# Patient Record
Sex: MALE | RURAL
[De-identification: names, ages, dates, MRNs, and addresses within clinical notes are randomized per-mention and may not be internally consistent; named-entity substitution may affect disease eponyms.]

---

## 2020-03-16 ENCOUNTER — HISTORICAL (OUTPATIENT)
Dept: ADMINISTRATIVE | Facility: HOSPITAL | Age: 50
End: 2020-03-16

## 2020-03-16 LAB
HCT VFR BLD AUTO: 45.2 % (ref 40–54)
HGB BLD-MCNC: 14.6 G/DL (ref 13.5–18)
MCHC RBC AUTO-ENTMCNC: 32.3 G/DL (ref 32–36)
POTASSIUM SERPL-SCNC: 4 MMOL/L (ref 3.5–5.1)

## 2020-03-17 ENCOUNTER — HISTORICAL (OUTPATIENT)
Dept: ADMINISTRATIVE | Facility: HOSPITAL | Age: 50
End: 2020-03-17

## 2020-03-17 LAB — URATE SERPL-MCNC: 5.6 MG/DL (ref 3.5–7.2)

## 2022-02-09 LAB — CRC RECOMMENDATION EXT: NORMAL

## 2024-10-15 ENCOUNTER — OFFICE VISIT (OUTPATIENT)
Dept: FAMILY MEDICINE | Facility: CLINIC | Age: 54
End: 2024-10-15
Payer: COMMERCIAL

## 2024-10-15 VITALS
HEIGHT: 66 IN | RESPIRATION RATE: 20 BRPM | SYSTOLIC BLOOD PRESSURE: 140 MMHG | WEIGHT: 194.63 LBS | DIASTOLIC BLOOD PRESSURE: 90 MMHG | BODY MASS INDEX: 31.28 KG/M2 | OXYGEN SATURATION: 98 % | TEMPERATURE: 98 F | HEART RATE: 87 BPM

## 2024-10-15 DIAGNOSIS — Z02.4 ENCOUNTER FOR DEPARTMENT OF TRANSPORTATION (DOT) EXAMINATION FOR DRIVING LICENSE RENEWAL: Primary | ICD-10-CM

## 2024-10-15 PROCEDURE — 1159F MED LIST DOCD IN RCRD: CPT | Mod: CPTII,,, | Performed by: NURSE PRACTITIONER

## 2024-10-15 PROCEDURE — 3077F SYST BP >= 140 MM HG: CPT | Mod: CPTII,,, | Performed by: NURSE PRACTITIONER

## 2024-10-15 PROCEDURE — 3080F DIAST BP >= 90 MM HG: CPT | Mod: CPTII,,, | Performed by: NURSE PRACTITIONER

## 2024-10-15 PROCEDURE — 99203 OFFICE O/P NEW LOW 30 MIN: CPT | Mod: ,,, | Performed by: NURSE PRACTITIONER

## 2024-10-15 PROCEDURE — 1160F RVW MEDS BY RX/DR IN RCRD: CPT | Mod: CPTII,,, | Performed by: NURSE PRACTITIONER

## 2024-10-15 PROCEDURE — 3008F BODY MASS INDEX DOCD: CPT | Mod: CPTII,,, | Performed by: NURSE PRACTITIONER

## 2024-10-15 RX ORDER — EZETIMIBE 10 MG/1
10 TABLET ORAL
COMMUNITY
Start: 2024-08-08

## 2024-10-15 RX ORDER — METOPROLOL TARTRATE 50 MG/1
50 TABLET ORAL 2 TIMES DAILY
COMMUNITY
Start: 2024-08-08

## 2024-10-15 RX ORDER — OMEGA-3-ACID ETHYL ESTERS 1 G/1
2 CAPSULE, LIQUID FILLED ORAL 2 TIMES DAILY
COMMUNITY
Start: 2024-08-21

## 2024-10-15 RX ORDER — AMLODIPINE BESYLATE 10 MG/1
10 TABLET ORAL
COMMUNITY
Start: 2024-08-08

## 2024-10-15 NOTE — ASSESSMENT & PLAN NOTE
Reviewed UA.  Advised  to carry a spare power source for hearing aids (if used) and to carry a spare pair of glasses/contact lenses if contacts or glasses are worn.  Discussed with  that there are potential side effects of medications (including over the counter medications) that may affect driving:  altered state of alertness, attention or temporary confusion, low blood pressure, sedation or dizziness.  Encouraged  to read labels on all medications prior to taking them.  Instructed pt not to drive/operate machinery for 12 hours after taking sedating medications.  Fatigue, lack of sleep, poor diet, emotional conditions, stress or illness can affect safe driving.  Pt verbalizes understanding.    Pt certified for one year certificate.

## 2024-10-15 NOTE — PROGRESS NOTES
"   RANDA Fields   [facility]  [address]  [place] [address]     PATIENT NAME: Rios James  : 1970  DATE: 10/15/24  MRN: [id number]      Billing Provider: RANDA Fields  Level of Service:   Patient PCP Information       Provider PCP Type    Primary Doctor No General            Reason for Visit / Chief Complaint: DOT physical    Health Maintenance Due   Topic Date Due    Hepatitis C Screening  Never done    Lipid Panel  Never done    HIV Screening  Never done    TETANUS VACCINE  Never done    Hemoglobin A1c (Diabetic Prevention Screening)  Never done    Colorectal Cancer Screening  Never done    Shingles Vaccine (1 of 2) Never done    Influenza Vaccine (1) Never done    COVID-19 Vaccine ( season) Never done          Update PCP  Update Chief Complaint         History of Present Illness / Problem Focused Workflow     Rios James presents to the clinic for DOT exam. Pt has hx of HTN and takes amlodipine, metoprolol as directed. He states he always gets nervous coming to the doctor. BP does come down at end of visit. He denies any other needs at this time.    No results found for: "HGBA1C"     CMP  Potassium   Date Value Ref Range Status   2020 4.0 3.5 - 5.1 mmol/L      Comment:     The above 1 analytes were performed by [facility] [id number] [address] [place] [id number]        Lab Results   Component Value Date    HGB 14.6 2020    HCT 45.2 2020    MCHC 32.3 2020        No results found for: "CHOL"  No results found for: "HDL"  No results found for: "LDLCALC"  No results found for: "TRIG"  No results found for: "CHOLHDL"     Wt Readings from Last 3 Encounters:   10/15/24 1014 88.3 kg (194 lb 9.6 oz)        BP Readings from Last 3 Encounters:   10/15/24 (!) 140/90        Review of Systems     Review of Systems   Constitutional: Negative.    HENT: Negative.     Eyes: Negative.    Respiratory: Negative.     Cardiovascular: Negative.    Gastrointestinal: " "Negative.    Endocrine: Negative.    Genitourinary: Negative.    Musculoskeletal: Negative.    Integumentary:  Negative.   Allergic/Immunologic: Negative.    Neurological: Negative.    Hematological: Negative.    Psychiatric/Behavioral: Negative.          Medical / Social / Family History     Past Medical History:   Diagnosis Date    Hyperlipidemia     Hypertension        Past Surgical History:   Procedure Laterality Date    KNEE SURGERY         Social History    reports that he has never smoked. He has never used smokeless tobacco. He reports that he does not currently use alcohol. He reports that he does not use drugs.    Family History  's family history includes Hypertension in his father and mother; Thyroid disease in his father.    Medications and Allergies     Medications  Outpatient Medications Marked as Taking for the 10/15/24 encounter (Office Visit) with Lamar Ashraf FNP   Medication Sig Dispense Refill    amLODIPine (NORVASC) 10 MG tablet Take 10 mg by mouth.      ezetimibe (ZETIA) 10 mg tablet Take 10 mg by mouth.      metoprolol tartrate (LOPRESSOR) 50 MG tablet Take 50 mg by mouth 2 (two) times daily.      omega-3 acid ethyl esters (LOVAZA) 1 gram capsule Take 2 capsules by mouth 2 (two) times daily.         Allergies  Review of patient's allergies indicates:   Allergen Reactions    Norco [hydrocodone-acetaminophen] Nausea Only    Zithromax z-ced [azithromycin] Nausea And Vomiting       Physical Examination     Vitals:    10/15/24 1014 10/15/24 1259   BP: (!) 165/99 (!) 140/90   Pulse: 87    Resp: 20    Temp: 98.1 °F (36.7 °C)    TempSrc: Oral    SpO2: 98%    Weight: 88.3 kg (194 lb 9.6 oz)    Height: 5' 6" (1.676 m)       Physical Exam  Constitutional:       Appearance: Normal appearance. He is obese.   HENT:      Head: Normocephalic.      Right Ear: Hearing, tympanic membrane, ear canal and external ear normal.      Left Ear: Hearing, tympanic membrane, ear canal and external ear normal.      " Nose: Nose normal.      Mouth/Throat:      Mouth: Mucous membranes are moist.      Pharynx: Oropharynx is clear.   Eyes:      Extraocular Movements: Extraocular movements intact.      Pupils: Pupils are equal, round, and reactive to light.   Cardiovascular:      Rate and Rhythm: Normal rate and regular rhythm.      Pulses: Normal pulses.      Heart sounds: Normal heart sounds.   Pulmonary:      Effort: Pulmonary effort is normal.      Breath sounds: Normal breath sounds.   Abdominal:      General: Abdomen is flat. Bowel sounds are normal.      Palpations: Abdomen is soft.   Skin:     General: Skin is warm and dry.      Capillary Refill: Capillary refill takes less than 2 seconds.   Neurological:      General: No focal deficit present.      Mental Status: He is alert and oriented to person, place, and time.   Psychiatric:         Mood and Affect: Mood normal.         Behavior: Behavior normal.          Assessment and Plan (including Health Maintenance)      Problem List  Smart Sets  Document Outside HM   :    Plan:     There are no Patient Instructions on file for this visit.       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  Advised  to carry a spare power source for hearing aids (if used) and to carry a spare pair of glasses/contact lenses if contacts or glasses are worn.  Discussed with  that there are potential side effects of medications (including over the  counter medications) that may affect driving:  altered state of alertness, attention or temporary confusion, low blood pressure, sedation or dizziness.  Encouraged  to read labels on all medications prior to taking them.  Instructed pt not to drive/operate machinery for 12 hours after taking sedating medications.  Fatigue, lack of sleep, poor diet, emotional conditions, stress or illness can affect safe driving.  Pt verbalizes understanding.    Pt certified for one year certificate.           Encounter for Department of Transportation (DOT) examination for driving license renewal       Health Maintenance Topics with due status: Not Due       Topic Last Completion Date    RSV Vaccine (Age 60+ and Pregnant patients) Not Due         No future appointments.     No follow-ups on file.    Health Maintenance Due   Topic Date Due    Hepatitis C Screening  Never done    Lipid Panel  Never done    HIV Screening  Never done    TETANUS VACCINE  Never done    Hemoglobin A1c (Diabetic Prevention Screening)  Never done    Colorectal Cancer Screening  Never done    Shingles Vaccine (1 of 2) Never done    Influenza Vaccine (1) Never done    COVID-19 Vaccine (1 - 2024-25 season) Never done        Signature:  RANDA Fields    Date of encounter: 10/15/24

## 2025-02-11 ENCOUNTER — PATIENT OUTREACH (OUTPATIENT)
Facility: HOSPITAL | Age: 55
End: 2025-02-11
Payer: COMMERCIAL

## 2025-02-11 ENCOUNTER — OFFICE VISIT (OUTPATIENT)
Dept: FAMILY MEDICINE | Facility: CLINIC | Age: 55
End: 2025-02-11
Payer: COMMERCIAL

## 2025-02-11 VITALS
RESPIRATION RATE: 20 BRPM | HEIGHT: 66 IN | BODY MASS INDEX: 31.44 KG/M2 | OXYGEN SATURATION: 96 % | WEIGHT: 195.63 LBS | SYSTOLIC BLOOD PRESSURE: 140 MMHG | HEART RATE: 89 BPM | TEMPERATURE: 98 F | DIASTOLIC BLOOD PRESSURE: 70 MMHG

## 2025-02-11 DIAGNOSIS — I10 HYPERTENSION, UNSPECIFIED TYPE: Primary | ICD-10-CM

## 2025-02-11 DIAGNOSIS — Z12.5 SCREENING FOR PROSTATE CANCER: ICD-10-CM

## 2025-02-11 DIAGNOSIS — Z13.1 SCREENING FOR DIABETES MELLITUS: ICD-10-CM

## 2025-02-11 DIAGNOSIS — E78.00 HYPERCHOLESTEROLEMIA: ICD-10-CM

## 2025-02-11 LAB
ALBUMIN SERPL BCP-MCNC: 4 G/DL (ref 3.5–5)
ALBUMIN/GLOB SERPL: 1 {RATIO}
ALP SERPL-CCNC: 100 U/L (ref 40–150)
ALT SERPL W P-5'-P-CCNC: 19 U/L
ANION GAP SERPL CALCULATED.3IONS-SCNC: 14 MMOL/L (ref 7–16)
AST SERPL W P-5'-P-CCNC: 24 U/L (ref 5–34)
BASOPHILS # BLD AUTO: 0.05 K/UL (ref 0–0.2)
BASOPHILS NFR BLD AUTO: 0.6 % (ref 0–1)
BILIRUB SERPL-MCNC: 0.9 MG/DL
BUN SERPL-MCNC: 9 MG/DL (ref 8–26)
BUN/CREAT SERPL: 10 (ref 6–20)
CALCIUM SERPL-MCNC: 9.5 MG/DL (ref 8.4–10.2)
CHLORIDE SERPL-SCNC: 104 MMOL/L (ref 98–107)
CHOLEST SERPL-MCNC: 165 MG/DL
CHOLEST/HDLC SERPL: 4.7 {RATIO}
CO2 SERPL-SCNC: 27 MMOL/L (ref 22–29)
CREAT SERPL-MCNC: 0.9 MG/DL (ref 0.72–1.25)
DIFFERENTIAL METHOD BLD: ABNORMAL
EGFR (NO RACE VARIABLE) (RUSH/TITUS): 101 ML/MIN/1.73M2
EOSINOPHIL # BLD AUTO: 0.27 K/UL (ref 0–0.5)
EOSINOPHIL NFR BLD AUTO: 3.3 % (ref 1–4)
ERYTHROCYTE [DISTWIDTH] IN BLOOD BY AUTOMATED COUNT: 13.1 % (ref 11.5–14.5)
EST. AVERAGE GLUCOSE BLD GHB EST-MCNC: 128 MG/DL
GLOBULIN SER-MCNC: 4.2 G/DL (ref 2–4)
GLUCOSE SERPL-MCNC: 97 MG/DL (ref 74–100)
HBA1C MFR BLD HPLC: 6.1 %
HCT VFR BLD AUTO: 51.4 % (ref 40–54)
HDLC SERPL-MCNC: 35 MG/DL (ref 35–60)
HGB BLD-MCNC: 17 G/DL (ref 13.5–18)
IMM GRANULOCYTES # BLD AUTO: 0.02 K/UL (ref 0–0.04)
IMM GRANULOCYTES NFR BLD: 0.2 % (ref 0–0.4)
LDLC SERPL CALC-MCNC: 107 MG/DL
LDLC/HDLC SERPL: 3.1 {RATIO}
LYMPHOCYTES # BLD AUTO: 2.96 K/UL (ref 1–4.8)
LYMPHOCYTES NFR BLD AUTO: 35.7 % (ref 27–41)
MCH RBC QN AUTO: 28.3 PG (ref 27–31)
MCHC RBC AUTO-ENTMCNC: 33.1 G/DL (ref 32–36)
MCV RBC AUTO: 85.7 FL (ref 80–96)
MONOCYTES # BLD AUTO: 0.77 K/UL (ref 0–0.8)
MONOCYTES NFR BLD AUTO: 9.3 % (ref 2–6)
MPC BLD CALC-MCNC: 10.2 FL (ref 9.4–12.4)
NEUTROPHILS # BLD AUTO: 4.22 K/UL (ref 1.8–7.7)
NEUTROPHILS NFR BLD AUTO: 50.9 % (ref 53–65)
NONHDLC SERPL-MCNC: 130 MG/DL
NRBC # BLD AUTO: 0 X10E3/UL
NRBC, AUTO (.00): 0 %
PLATELET # BLD AUTO: 283 K/UL (ref 150–400)
POTASSIUM SERPL-SCNC: 4.2 MMOL/L (ref 3.5–5.1)
PROT SERPL-MCNC: 8.2 G/DL (ref 6.4–8.3)
PSA SERPL-MCNC: 2.84 NG/ML
RBC # BLD AUTO: 6 M/UL (ref 4.6–6.2)
SODIUM SERPL-SCNC: 141 MMOL/L (ref 136–145)
TRIGL SERPL-MCNC: 117 MG/DL (ref 34–140)
TSH SERPL DL<=0.005 MIU/L-ACNC: 2.29 UIU/ML (ref 0.35–4.94)
VLDLC SERPL-MCNC: 23 MG/DL
WBC # BLD AUTO: 8.29 K/UL (ref 4.5–11)

## 2025-02-11 PROCEDURE — 83036 HEMOGLOBIN GLYCOSYLATED A1C: CPT | Mod: ,,, | Performed by: CLINICAL MEDICAL LABORATORY

## 2025-02-11 PROCEDURE — 99214 OFFICE O/P EST MOD 30 MIN: CPT | Mod: ,,, | Performed by: NURSE PRACTITIONER

## 2025-02-11 PROCEDURE — 1159F MED LIST DOCD IN RCRD: CPT | Mod: CPTII,,, | Performed by: NURSE PRACTITIONER

## 2025-02-11 PROCEDURE — 3008F BODY MASS INDEX DOCD: CPT | Mod: CPTII,,, | Performed by: NURSE PRACTITIONER

## 2025-02-11 PROCEDURE — G0103 PSA SCREENING: HCPCS | Mod: ,,, | Performed by: CLINICAL MEDICAL LABORATORY

## 2025-02-11 PROCEDURE — 80061 LIPID PANEL: CPT | Mod: ,,, | Performed by: CLINICAL MEDICAL LABORATORY

## 2025-02-11 PROCEDURE — 1160F RVW MEDS BY RX/DR IN RCRD: CPT | Mod: CPTII,,, | Performed by: NURSE PRACTITIONER

## 2025-02-11 PROCEDURE — 3044F HG A1C LEVEL LT 7.0%: CPT | Mod: CPTII,,, | Performed by: NURSE PRACTITIONER

## 2025-02-11 PROCEDURE — 3078F DIAST BP <80 MM HG: CPT | Mod: CPTII,,, | Performed by: NURSE PRACTITIONER

## 2025-02-11 PROCEDURE — 3077F SYST BP >= 140 MM HG: CPT | Mod: CPTII,,, | Performed by: NURSE PRACTITIONER

## 2025-02-11 PROCEDURE — 80050 GENERAL HEALTH PANEL: CPT | Mod: ,,, | Performed by: CLINICAL MEDICAL LABORATORY

## 2025-02-11 RX ORDER — EZETIMIBE 10 MG/1
10 TABLET ORAL NIGHTLY
Qty: 90 TABLET | Refills: 1 | Status: SHIPPED | OUTPATIENT
Start: 2025-02-11

## 2025-02-11 RX ORDER — OMEGA-3-ACID ETHYL ESTERS 1 G/1
2 CAPSULE, LIQUID FILLED ORAL 2 TIMES DAILY
Qty: 180 CAPSULE | Refills: 1 | Status: SHIPPED | OUTPATIENT
Start: 2025-02-11

## 2025-02-11 RX ORDER — AMLODIPINE BESYLATE 10 MG/1
10 TABLET ORAL DAILY
Qty: 90 TABLET | Refills: 1 | Status: SHIPPED | OUTPATIENT
Start: 2025-02-11

## 2025-02-11 RX ORDER — METOPROLOL TARTRATE 50 MG/1
TABLET ORAL
Qty: 270 TABLET | Refills: 1 | Status: SHIPPED | OUTPATIENT
Start: 2025-02-11

## 2025-02-11 NOTE — LETTER
AUTHORIZATION FOR RELEASE OF   CONFIDENTIAL INFORMATION    Dear Twan,    We are seeing Rios James, date of birth 1970, in the clinic at No Department Specified. No, Primary Doctor is the patient's PCP. Rios James has an outstanding lab/procedure at the time we reviewed his chart. In order to help keep his health information updated, he has authorized us to request the following medical record(s):        (  )  MAMMOGRAM                                      ( X )  COLONOSCOPY      (  )  PAP SMEAR                                          (  )  OUTSIDE LAB RESULTS     (  )  DEXA SCAN                                          (  )  EYE EXAM            (  )  FOOT EXAM                                          (  )  ENTIRE RECORD     (  )  OUTSIDE IMMUNIZATIONS                 (  )  _______________         Please fax records to Francisca Hutchins LPN Care Coordinator at [phone].       If you have any questions, please call [phone]          Patient Name: Rios James  : 1970  Patient Phone #: [phone]              Rios James  MRN: [id number]  : 1970  Age: 53 y.o.  Sex: male         Patient/Legal Guardian Signature  This signature was collected at 10/15/2024    self       _______________________________   Printed Name/Relationship to Patient      Consent for Examination and Treatment: I hereby authorize the providers and employees of [facility] ([facility]) to provide medical treatment/services which includes, but is not limited to, performing and administering tests and diagnostic procedures that are deemed necessary, including, but not limited to, imaging examinations, blood tests and other laboratory procedures as may be required by the hospital, clinic, or may be ordered by my physician(s) or persons working under the general and/or special instructions of my physician(s).      I understand and agree that this consent covers all authorized persons, including but  not limited to physicians, residents, nurse practitioners, physicians' assistants, specialists, consultants, student nurses, and independently contracted physicians, who are called upon by the physician in charge, to carry out the diagnostic procedures and medical or surgical treatment.     I hereby authorize [facility] to retain or dispose of any specimens or tissue, should there be such remaining from any test or procedure.     I hereby authorize and give consent for [facility] providers and employees to take photographs, images or videotapes of such diagnostic, surgical or treatment procedures of Patient as may be required by [facility] or as may be ordered by a physician. I further acknowledge and agree that [facility] may use cameras or other devices for patient monitoring.     I am aware that the practice of medicine is not an exact science, and I acknowledge that no guarantees have been made to me as to the outcome of any tests, procedures or treatment.     Authorization for Release of Information: I understand that my insurance company and/or their agents may need information necessary to make determinations about payment/reimbursement. I hereby provide authorization to release to all insurance companies, their successors, assignees, other parties with whom they may have contracted, or others acting on their behalf, that are involved with payment for any hospital and/or clinic charges incurred by the patient, any information that they request and deem necessary for payment/reimbursement, and/or quality review.  I further authorize the release of my health information to physicians or other health care practitioners on staff who are involved in my health care now and in the future, and to other health care providers, entities, or institutions for the purpose of my continued care and treatment, including referrals.     REGISTRATION AUTHORIZATION  Form No. 37116 (Rev. 3/25/2024)    Page 1 of 3                        Medicare Patient's Certification and Authorization to Release Information and Payment Request:  I certify that the information given by me in applying for payment under Title XVIII of the Social Security Act is correct. I authorize any arndt of medical or other information about me to release to the Social SecurityAdministration, or its intermediaries or carriers, any information needed for this or a related Medicare claim. I request that payment of authorized benefits be made on my behalf.     Assignment of Insurance Benefits:   I hereby authorize any and all insurance companies, health plans, defined   benefit plans, health insurers or any entity that is or may be responsible for payment of my medical expenses to pay all hospital and medical benefits now due, and to become due and payable to me under any hospital benefits, sick benefits, injury benefits or any other benefit for services rendered to me, including Major Medical Benefits, direct to [facility] and all independently contracted physicians. I assign any and all rights that I may have against any and all insurance companies, health plans, defined benefit plans, health insurers or any entity that is or may be responsible for payment of my medical expenses, including, but not limited to any right to appeal a denial of a claim, any right to bring any action, lawsuit, administrative proceeding, or other cause of action on my behalf. I specifically assign my right to pursue litigation against any and all insurance companies, health plans, defined benefit plans, health insurers or any entity that is or may be responsible for payment of my medical expenses based upon a refusal to pay charges.            E. Valuables: It is understood and agreed that [facility] is not liable for the damage to or loss of any money, jewelry,   documents, dentures, eye glasses, hearing aids, prosthetics, or other property of value.     F. Computer Equipment: I understand and agree that  should I choose to use computer equipment owned by [facility] or if I choose to access the Internet via [facility]s network, I do so at my own risk. [facility] is not responsible for any damage to my computer equipment or to any damages of any type that might arise from my loss of equipment or data.     G. Acceptance of Financial Responsibility:  I agree that in consideration of the services and   supplies that have been   or will be furnished to the patient, I am hereby obligated to pay all charges made for or on the account of the patient according to the standard rates (in effect at the time the services and supplies are delivered) established by [facility], including its Patient Financial Assistance Policy to the extent it is applicable. I understand that I am responsible for all charges, or portions thereof, not covered by insurance or other sources. Patient refunds will be distributed only after balances at all [facility] facilities are paid.     H. Communication Authorization:  I hereby authorize [facility] and its representatives, along with any billing service   or  who may work on their behalf, to contact me on   my cell phone and/or home phone using pre- recorded messages, artificial voice messages, automatic telephone dialing devices or other computer assisted technology, or by electronic      mail, text messaging, or by any other form of electronic communication. This includes, but is not limited to, appointment reminders, yearly physical exam reminders, preventive care reminders, patient campaigns, welcome calls, and calls about account balances on my account or any account on which I am listed as a guarantor. I understand I have the right to opt out of these communications at any time.      Relationship  Between  Facility and  Provider:      I understand that some, but not all, providers furnishing services to the patient are not employees or agents of [facility]. The patient is under the care and  supervision of his/her attending physician, and it is the responsibility of the facility and its nursing staff to carry out the instructions of such physicians. It is the responsibility of the patient's physician/designee to obtain the patient's informed consent, when required, for medical or surgical treatment, special diagnostic or therapeutic procedures, or hospital services rendered for the patient under the special instructions of the physician/designee.           REGISTRATION AUTHORIZATION  Form No. 97383 (Rev. 3/25/2024)    Page 2 of 3                       Immunizations: [facility] shares immunization information with state sponsored health departments to help you and your doctor keep track of your immunization records. By signing, you consent to have this information shared with the health department in your state:                                [place] - LINKS ([place] Immunization Network for Kids Statewide)                                [place] - MIIX ([place] Immunization Information eXchange)                                [place] - ImmPRINT (Immunization Patient Registry with Integrated Technology)     TERM: This authorization is valid for this and subsequent care/treatment I receive at [facility] and will remain valid unless/until revoked in writing by me.     [facility]: As used in this document, [facility] means all [facility] owned and managed facilities, including, but not limited to, all health centers, surgery centers, clinics, urgent care centers, and hospitals.         [facility] complies with applicable Federal civil rights laws and does not discriminate on the basis of race, color, national origin, age, disability, or sex.  ATENCIÓN: si habla español, tiene a mccartney disposición servicios gratuitos de asistencia lingüística. Clifton hooker [phone].  CHÚ Ý: N?u b?n nói Ti?ng Vi?t, có các d?ch v? h? tr? ngôn ng? mi?n phí dành cho b?n. G?i s? [phone].         REGISTRATION AUTHORIZATION  Form No. 55473 (Rev. 3/25/2024)   Page 3 of 3

## 2025-02-11 NOTE — PROGRESS NOTES
Population Health Chart Review & Patient Outreach Details    Updates Requested / Reviewed:  [x]  Care Team Updated      Health Maintenance Topics Addressed and Outreach Outcomes / Actions Taken:  Colon Cancer Screening [x] WINTER sent to [facility]

## 2025-02-11 NOTE — Clinical Note
Please call pt next week and see how bp is doing since increasing metoprolol. He has cuff at home and will keep a check on it. Thanks!

## 2025-02-11 NOTE — Clinical Note
Pt states he had a colonoscopy done at [facility] 2 or 3 years ago can we see if they have any records to attach to the chart. Thank you

## 2025-02-12 PROBLEM — Z12.5 SCREENING FOR PROSTATE CANCER: Status: ACTIVE | Noted: 2025-02-12

## 2025-02-12 PROBLEM — Z13.1 SCREENING FOR DIABETES MELLITUS: Status: ACTIVE | Noted: 2025-02-12

## 2025-02-12 PROBLEM — E78.00 HYPERCHOLESTEROLEMIA: Status: ACTIVE | Noted: 2025-02-12

## 2025-02-12 NOTE — PROGRESS NOTES
RANDA Fields   [facility]  [address]  [place] [address]     PATIENT NAME: Rios James  : 1970  DATE: 25  MRN: [id number]      Billing Provider: RANDA Fields  Level of Service:   Patient PCP Information       Provider PCP Type    Primary Doctor No General            Reason for Visit / Chief Complaint: Hypertension (Pt just here for labs and med refills ) and Health Maintenance (Had colonoscopy 2-3 years ago at [facility] will get records requested )   Health Maintenance Due   Topic Date Due    Hepatitis C Screening  Never done    HIV Screening  Never done    TETANUS VACCINE  Never done    Colorectal Cancer Screening  Never done    Shingles Vaccine (1 of 2) Never done    Pneumococcal Vaccines (Age 50+) (1  - PCV) Never done    COVID-19 Vaccine (1 - 2024-25 season) Never done          Update PCP  Update Chief Complaint         History of Present Illness / Problem Focused Workflow     History of Present Illness    CHIEF COMPLAINT:  Patient presents today for follow up    HYPERTENSION:  He reports home blood pressure readings consistently around 140-145/80 and denies headaches. He is currently taking maximum dose of amlodipine and metoprolol 50 mg twice daily.    ALLERGIES:  He has a history of allergic reaction to lisinopril causing throat swelling.    MEDICAL CARE:  His last bloodwork was performed 6 months ago. He was previously under the care of Yanira Sahni NP at [facility] and had a scope procedure at [facility] 2-3 years ago.          Hemoglobin A1C   Date Value Ref Range Status   2025 6.1 <=7.0 % Final     Comment:       Normal:               <5.7%  Pre-Diabetic:       5.7% to 6.4%  Diabetic:             >6.4%  Diabetic Goal:     <7%        CMP  Sodium   Date Value Ref Range Status   2025 141 136 - 145 mmol/L Final     Potassium   Date Value Ref Range Status   2025 4.2 3.5 - 5.1 mmol/L Final     Chloride   Date Value Ref Range Status   2025 104  98 - 107 mmol/L Final     CO2   Date Value Ref Range Status   02/11/2025 27 22 - 29 mmol/L Final     Glucose   Date Value Ref Range Status   02/11/2025 97 74 - 100 mg/dL Final     BUN   Date Value Ref Range Status   02/11/2025 9 8 - 26 mg/dL Final     Creatinine   Date Value Ref Range Status   02/11/2025 0.90 0.72 - 1.25 mg/dL Final     Calcium   Date Value Ref Range Status   02/11/2025 9.5 8.4 - 10.2 mg/dL Final     Total Protein   Date Value Ref Range Status   02/11/2025 8.2 6.4 - 8.3 g/dL Final     Albumin   Date Value Ref Range Status   02/11/2025 4.0 3.5 - 5.0 g/dL Final     Bilirubin, Total   Date Value Ref Range Status   02/11/2025 0.9 <=1.5 mg/dL Final     Alk Phos   Date Value Ref Range Status   02/11/2025 100 40 - 150 U/L Final     AST   Date Value Ref Range Status   02/11/2025 24 5 - 34 U/L Final     ALT   Date Value Ref Range Status   02/11/2025 19 <=55 U/L Final     Anion Gap   Date Value Ref Range Status   02/11/2025 14 7 - 16 mmol/L Final     eGFR   Date Value Ref Range Status   02/11/2025 101 >=60 mL/min/1.73m2 Final     Comment:     Estimated GFR calculated using the CKD-EPI creatinine (2021) equation.        Lab Results   Component Value Date    WBC 8.29 02/11/2025    RBC 6.00 02/11/2025    HGB 17.0 02/11/2025    HCT 51.4 02/11/2025    MCV 85.7 02/11/2025    MCH 28.3 02/11/2025    MCHC 33.1 02/11/2025    RDW 13.1 02/11/2025     02/11/2025    MPV 10.2 02/11/2025    LYMPH 35.7 02/11/2025    LYMPH 2.96 02/11/2025    MONO 9.3 (H) 02/11/2025    EOS 0.27 02/11/2025    BASO 0.05 02/11/2025    EOSINOPHIL 3.3 02/11/2025    BASOPHIL 0.6 02/11/2025        Lab Results   Component Value Date    CHOL 165 02/11/2025     Lab Results   Component Value Date    HDL 35 02/11/2025     Lab Results   Component Value Date    LDLCALC 107 02/11/2025     Lab Results   Component Value Date    TRIG 117 02/11/2025     Lab Results   Component Value Date    CHOLHDL 4.7 02/11/2025        Wt Readings from Last 3 Encounters:  "  02/11/25 1038 88.7 kg (195 lb 9.6 oz)   10/15/24 1014 88.3 kg (194 lb 9.6 oz)        BP Readings from Last 3 Encounters:   02/11/25 (!) 140/70   10/15/24 (!) 140/90        Review of Systems     Review of Systems       Medical / Social / Family History     Past Medical History:   Diagnosis Date    Hyperlipidemia     Hypertension        Past Surgical History:   Procedure Laterality Date    KNEE SURGERY         Social History    reports that he has never smoked. He has never used smokeless tobacco. He reports that he does not currently use alcohol. He reports that he does not use drugs.    Family History  's family history includes Hypertension in his father and mother; Thyroid disease in his father.    Medications and Allergies     Medications  Outpatient Medications Marked as Taking for the 2/11/25 encounter (Office Visit) with Lamar Ashraf FNP   Medication Sig Dispense Refill    [DISCONTINUED] amLODIPine (NORVASC) 10 MG tablet Take 10 mg by mouth.      [DISCONTINUED] ezetimibe (ZETIA) 10 mg tablet Take 10 mg by mouth.      [DISCONTINUED] metoprolol tartrate (LOPRESSOR) 50 MG tablet Take 50 mg by mouth 2 (two) times daily.      [DISCONTINUED] omega-3 acid ethyl esters (LOVAZA) 1 gram capsule Take 2 capsules by mouth 2 (two) times daily.         Allergies  Review of patient's allergies indicates:   Allergen Reactions    Norco [hydrocodone-acetaminophen] Nausea Only    Zithromax z-ced [azithromycin] Nausea And Vomiting       Physical Examination     Vitals:    02/11/25 1038 02/11/25 1102   BP: (!) 166/94 (!) 140/70   BP Location: Left arm    Patient Position: Sitting    Pulse: 89    Resp: 20    Temp: 97.6 °F (36.4 °C)    TempSrc: Oral    SpO2: 96%    Weight: 88.7 kg (195 lb 9.6 oz)    Height: 5' 6" (1.676 m)       Physical Exam  Constitutional:       Appearance: Normal appearance. He is obese.   HENT:      Head: Normocephalic.   Eyes:      Extraocular Movements: Extraocular movements intact.   Cardiovascular: "      Rate and Rhythm: Normal rate and regular rhythm.      Pulses: Normal pulses.      Heart sounds: Normal heart sounds.   Pulmonary:      Effort: Pulmonary effort is normal.      Breath sounds: Normal breath sounds.   Skin:     General: Skin is warm and dry.      Capillary Refill: Capillary refill takes less than 2 seconds.   Neurological:      General: No focal deficit present.      Mental Status: He is alert and oriented to person, place, and time.   Psychiatric:         Mood and Affect: Mood normal.         Behavior: Behavior normal.          Assessment and Plan (including Health Maintenance)      Problem List  Smart Sets  Document Outside HM   :    Plan:     There are no Patient Instructions on file for this visit.       Health Maintenance Due   Topic Date Due    Hepatitis C Screening  Never done    HIV Screening  Never done    TETANUS VACCINE  Never done    Colorectal Cancer Screening  Never done    Shingles Vaccine (1 of 2) Never done    Pneumococcal Vaccines (Age 50+) (1 of 1 - PCV) Never done    COVID-19 Vaccine (1 - 2024-25 season) Never done       Problem List Items Addressed This Visit       Hypercholesterolemia    Relevant Medications    ezetimibe (ZETIA) 10 mg tablet    omega-3 acid ethyl esters (LOVAZA) 1 gram capsule    Other Relevant Orders    Lipid Panel (Completed)    Hypertension - Primary    Relevant Medications    amLODIPine (NORVASC) 10 MG tablet    metoprolol tartrate (LOPRESSOR) 50 MG tablet    Other Relevant Orders    CBC Auto Differential (Completed)    Comprehensive Metabolic Panel (Completed)    TSH (Completed)    Screening for diabetes mellitus    Relevant Orders    Hemoglobin A1C (Completed)    Screening for prostate cancer    Relevant Orders    PSA, Screening (Completed)     Assessment & Plan    IMPRESSION:  - Assessed patient's blood pressure, noting it is elevated and right on the verge of being controlled at 140 systolic  - Reviewed patient's home blood pressure readings  (140-145/80)  - Considered increasing metoprolol dosage due to persistent hypertension and patient's heart rate not being too low  - Will increase morning metoprolol dose while maintaining evening dose to better manage blood pressure    HYPERTENSION:  - Increased metoprolol from 50 mg twice daily to 100 mg in the morning and 50 mg in the evening for hypertension.  - Patient reports home blood pressure readings of 140-145/80 and denies having headaches.  - Measured blood pressure in office: initially high, then 140/70 on second measurement.  - Heart rate is 89, which is within normal range.  - Assessed that the patient's blood pressure is borderline high and considered medication adjustment.  - Current medications include amlodipine (at maximum dose) and metoprolol 50 mg twice daily.  - Adjusted metoprolol dosage to 100 mg in the morning and 50 mg in the evening.  - Prescribed 270 tablets of metoprolol for a 90-day supply.    FOLLOW UP:  - Will call pt next week to have him check BP and let us know how it is doing. Pt vu.  - Scheduled a 6-month follow-up visit.        Hypertension, unspecified type  -     amLODIPine (NORVASC) 10 MG tablet; Take 1 tablet (10 mg total) by mouth once daily.  Dispense: 90 tablet; Refill: 1  -     metoprolol tartrate (LOPRESSOR) 50 MG tablet; TAKE 2 TABLETS BY MOUTH DAILY IN THE MORNING THEN TAKE 1 TABLET BY MOUTH EVERY EVENING  Dispense: 270 tablet; Refill: 1  -     CBC Auto Differential; Future; Expected date: 02/11/2025  -     Comprehensive Metabolic Panel; Future; Expected date: 02/11/2025  -     TSH; Future; Expected date: 02/11/2025    Hypercholesterolemia  -     Lipid Panel; Future; Expected date: 02/11/2025  -     ezetimibe (ZETIA) 10 mg tablet; Take 1 tablet (10 mg total) by mouth every evening.  Dispense: 90 tablet; Refill: 1  -     omega-3 acid ethyl esters (LOVAZA) 1 gram capsule; Take 2 capsules (2 g total) by mouth 2 (two) times daily.  Dispense: 180 capsule; Refill:  1    Screening for diabetes mellitus  -     Hemoglobin A1C; Future; Expected date: 02/11/2025    Screening for prostate cancer  -     PSA, Screening; Future; Expected date: 02/11/2025       Health Maintenance Topics with due status: Not Due       Topic Last Completion Date    Hemoglobin A1c (Diabetic Prevention Screening) 02/11/2025    Lipid Panel 02/11/2025    RSV Vaccine (Age 60+ and Pregnant patients) Not Due         Future Appointments   Date Time Provider Department Center   8/12/2025  8:00 AM Lamar Ashraf FNP [facility]        Follow up in about 6 months (around 8/11/2025), or if symptoms worsen or fail to improve.    Health Maintenance Due   Topic Date Due    Hepatitis C Screening  Never done    HIV Screening  Never done    TETANUS VACCINE  Never done    Colorectal Cancer Screening  Never done    Shingles Vaccine (1 of 2) Never done    Pneumococcal Vaccines (Age 50+) (1 of 1 - PCV) Never done    COVID-19 Vaccine (1 - 2024-25 season) Never done        Signature:  RANDA Fields    Date of encounter: 2/11/25  This note was generated with the assistance of ambient listening technology. Verbal consent was obtained by the patient and accompanying visitor(s) for the recording of patient appointment to facilitate this note. I attest to having reviewed and edited the generated note for accuracy, though some syntax or spelling errors may persist. Please contact the author of this note for any clarification.

## 2025-02-25 ENCOUNTER — PATIENT OUTREACH (OUTPATIENT)
Facility: HOSPITAL | Age: 55
End: 2025-02-25
Payer: COMMERCIAL

## 2025-02-25 NOTE — PROGRESS NOTES
Population Health Chart Review & Patient Outreach Details    Updates Requested / Reviewed:  [x]  Care Team Updated      Health Maintenance Topics Addressed and Outreach Outcomes / Actions Taken:  Colon Cancer Screening [x] WINTER sent to [facility]

## 2025-02-25 NOTE — LETTER
AUTHORIZATION FOR RELEASE OF   CONFIDENTIAL INFORMATION    Dear [facility],    We are seeing Rios James, date of birth 1970, in the clinic at No Department Specified. No, Primary Doctor is the patient's PCP. Rios James has an outstanding lab/procedure at the time we reviewed his chart. In order to help keep his health information updated, he has authorized us to request the following medical record(s):        (  )  MAMMOGRAM                                      ( X )  COLONOSCOPY      (  )  PAP SMEAR                                          (  )  OUTSIDE LAB RESULTS     (  )  DEXA SCAN                                          (  )  EYE EXAM            (  )  FOOT EXAM                                          (  )  ENTIRE RECORD     (  )  OUTSIDE IMMUNIZATIONS                 (  )  _______________         Please fax records to Francisca Hutchins LPN Care Coordinator at [phone].       If you have any questions, please call [phone]          Patient Name: Rios James  : 1970  Patient Phone #: [phone]            Rios James  MRN: [id number]  : 1970  Age: 53 y.o.  Sex: male         Patient/Legal Guardian Signature  This signature was collected at 10/15/2024    self       _______________________________   Printed Name/Relationship to Patient      Consent for Examination and Treatment: I hereby authorize the providers and employees of [facility] ([facility]) to provide medical treatment/services which includes, but is not limited to, performing and administering tests and diagnostic procedures that are deemed necessary, including, but not limited to, imaging examinations, blood tests and other laboratory procedures as may be required by the hospital, clinic, or may be ordered by my physician(s) or persons working under the general and/or special instructions of my physician(s).      I understand and agree that this consent covers all authorized persons, including but not  limited to physicians, residents, nurse practitioners, physicians' assistants, specialists, consultants, student nurses, and independently contracted physicians, who are called upon by the physician in charge, to carry out the diagnostic procedures and medical or surgical treatment.     I hereby authorize [facility] to retain or dispose of any specimens or tissue, should there be such remaining from any test or procedure.     I hereby authorize and give consent for [facility] providers and employees to take photographs, images or videotapes of such diagnostic, surgical or treatment procedures of Patient as may be required by [facility] or as may be ordered by a physician. I further acknowledge and agree that [facility] may use cameras or other devices for patient monitoring.     I am aware that the practice of medicine is not an exact science, and I acknowledge that no guarantees have been made to me as to the outcome of any tests, procedures or treatment.     Authorization for Release of Information: I understand that my insurance company and/or their agents may need information necessary to make determinations about payment/reimbursement. I hereby provide authorization to release to all insurance companies, their successors, assignees, other parties with whom they may have contracted, or others acting on their behalf, that are involved with payment for any hospital and/or clinic charges incurred by the patient, any information that they request and deem necessary for payment/reimbursement, and/or quality review.  I further authorize the release of my health information to physicians or other health care practitioners on staff who are involved in my health care now and in the future, and to other health care providers, entities, or institutions for the purpose of my continued care and treatment, including referrals.     REGISTRATION AUTHORIZATION  Form No. 99573 (Rev. 3/25/2024)    Page 1 of 3                       Medicare  Patient's Certification and Authorization to Release Information and Payment Request:  I certify that the information given by me in applying for payment under Title XVIII of the Social Security Act is correct. I authorize any arndt of medical or other information about me to release to the Social SecurityAdministration, or its intermediaries or carriers, any information needed for this or a related Medicare claim. I request that payment of authorized benefits be made on my behalf.     Assignment of Insurance Benefits:   I hereby authorize any and all insurance companies, health plans, defined   benefit plans, health insurers or any entity that is or may be responsible for payment of my medical expenses to pay all hospital and medical benefits now due, and to become due and payable to me under any hospital benefits, sick benefits, injury benefits or any other benefit for services rendered to me, including Major Medical Benefits, direct to [facility] and all independently contracted physicians. I assign any and all rights that I may have against any and all insurance companies, health plans, defined benefit plans, health insurers or any entity that is or may be responsible for payment of my medical expenses, including, but not limited to any right to appeal a denial of a claim, any right to bring any action, lawsuit, administrative proceeding, or other cause of action on my behalf. I specifically assign my right to pursue litigation against any and all insurance companies, health plans, defined benefit plans, health insurers or any entity that is or may be responsible for payment of my medical expenses based upon a refusal to pay charges.            E. Valuables: It is understood and agreed that [facility] is not liable for the damage to or loss of any money, jewelry,   documents, dentures, eye glasses, hearing aids, prosthetics, or other property of value.     F. Computer Equipment: I understand and agree that should I  choose to use computer equipment owned by [facility] or if I choose to access the Internet via [facility]s network, I do so at my own risk. [facility] is not responsible for any damage to my computer equipment or to any damages of any type that might arise from my loss of equipment or data.     G. Acceptance of Financial Responsibility:  I agree that in consideration of the services and   supplies that have been   or will be furnished to the patient, I am hereby obligated to pay all charges made for or on the account of the patient according to the standard rates (in effect at the time the services and supplies are delivered) established by [facility], including its Patient Financial Assistance Policy to the extent it is applicable. I understand that I am responsible for all charges, or portions thereof, not covered by insurance or other sources. Patient refunds will be distributed only after balances at all [facility] facilities are paid.     H. Communication Authorization:  I hereby authorize [facility] and its representatives, along with any billing service   or  who may work on their behalf, to contact me on   my cell phone and/or home phone using pre- recorded messages, artificial voice messages, automatic telephone dialing devices or other computer assisted technology, or by electronic      mail, text messaging, or by any other form of electronic communication. This includes, but is not limited to, appointment reminders, yearly physical exam reminders, preventive care reminders, patient campaigns, welcome calls, and calls about account balances on my account or any account on which I am listed as a guarantor. I understand I have the right to opt out of these communications at any time.      Relationship  Between  Facility and  Provider:      I understand that some, but not all, providers furnishing services to the patient are not employees or agents of [facility]. The patient is under the care and supervision of  his/her attending physician, and it is the responsibility of the facility and its nursing staff to carry out the instructions of such physicians. It is the responsibility of the patient's physician/designee to obtain the patient's informed consent, when required, for medical or surgical treatment, special diagnostic or therapeutic procedures, or hospital services rendered for the patient under the special instructions of the physician/designee.           REGISTRATION AUTHORIZATION  Form No. 48227 (Rev. 3/25/2024)    Page 2 of 3                       Immunizations: [facility] shares immunization information with state sponsored health departments to help you and your doctor keep track of your immunization records. By signing, you consent to have this information shared with the health department in your state:                                [place] - LINKS ([place] Immunization Network for Kids Statewide)                                [place] - MIIX ([place] Immunization Information eXchange)                                [place] - ImmPRINT (Immunization Patient Registry with Integrated Technology)     TERM: This authorization is valid for this and subsequent care/treatment I receive at [facility] and will remain valid unless/until revoked in writing by me.     [facility]: As used in this document, [facility] means all [facility] owned and managed facilities, including, but not limited to, all health centers, surgery centers, clinics, urgent care centers, and hospitals.         [facility] complies with applicable Federal civil rights laws and does not discriminate on the basis of race, color, national origin, age, disability, or sex.  ATENCIÓN: si habla español, tiene a mccartney disposición servicios gratuitos de asistencia lingüística. [place]if[place] hooker [phone].  CHÚ Ý: N?u b?n nói Ti?ng Vi?t, có các d?ch v? h? tr? ngôn ng? mi?n phí dành cho b?n. G?i s? [phone].        REGISTRATION  AUTHORIZATION  Form No. 29679 (Rev. 3/25/2024)   Page 3 of 3

## 2025-02-26 ENCOUNTER — TELEPHONE (OUTPATIENT)
Dept: FAMILY MEDICINE | Facility: CLINIC | Age: 55
End: 2025-02-26
Payer: COMMERCIAL

## 2025-02-26 NOTE — TELEPHONE ENCOUNTER
----- Message from RANDA Fields sent at 2/11/2025 11:05 AM CST -----  Please call pt next week and see how bp is doing since increasing metoprolol. He has cuff at home and will keep a check on it. Thanks!

## 2025-02-27 ENCOUNTER — TELEPHONE (OUTPATIENT)
Dept: FAMILY MEDICINE | Facility: CLINIC | Age: 55
End: 2025-02-27
Payer: COMMERCIAL

## 2025-02-27 NOTE — TELEPHONE ENCOUNTER
I called and spoke to pt about his bp since he was in the clinic. Pt stated it is now averaging 132/75. Says he isn't having any flushed feelings anymore.

## 2025-02-28 ENCOUNTER — PATIENT OUTREACH (OUTPATIENT)
Facility: HOSPITAL | Age: 55
End: 2025-02-28
Payer: COMMERCIAL

## 2025-04-04 ENCOUNTER — PATIENT OUTREACH (OUTPATIENT)
Facility: HOSPITAL | Age: 55
End: 2025-04-04
Payer: COMMERCIAL

## 2025-04-04 VITALS — SYSTOLIC BLOOD PRESSURE: 139 MMHG | DIASTOLIC BLOOD PRESSURE: 70 MMHG

## 2025-04-04 NOTE — PROGRESS NOTES
Population Health Chart Review & Patient Outreach Details        Health Maintenance Topics Addressed and Outreach Outcomes / Actions Taken:  Blood Pressure Control [x] Remote bp updated

## 2025-04-24 DIAGNOSIS — M79.10 MYALGIA: Primary | ICD-10-CM

## 2025-04-24 PROBLEM — G72.9 MYOPATHY: Status: ACTIVE | Noted: 2025-04-24

## 2025-04-24 PROBLEM — G72.9 MYOPATHY: Status: RESOLVED | Noted: 2025-04-24 | Resolved: 2025-04-24

## 2025-06-02 DIAGNOSIS — E78.00 HYPERCHOLESTEROLEMIA: ICD-10-CM

## 2025-06-02 RX ORDER — OMEGA-3-ACID ETHYL ESTERS 1 G/1
2 CAPSULE, LIQUID FILLED ORAL 2 TIMES DAILY
Qty: 360 CAPSULE | Refills: 1 | Status: SHIPPED | OUTPATIENT
Start: 2025-06-02

## 2025-08-12 ENCOUNTER — OFFICE VISIT (OUTPATIENT)
Dept: FAMILY MEDICINE | Facility: CLINIC | Age: 55
End: 2025-08-12
Payer: COMMERCIAL

## 2025-08-12 VITALS
HEART RATE: 70 BPM | BODY MASS INDEX: 32.72 KG/M2 | SYSTOLIC BLOOD PRESSURE: 160 MMHG | DIASTOLIC BLOOD PRESSURE: 100 MMHG | RESPIRATION RATE: 19 BRPM | WEIGHT: 203.63 LBS | TEMPERATURE: 98 F | HEIGHT: 66 IN | OXYGEN SATURATION: 98 %

## 2025-08-12 DIAGNOSIS — I10 HYPERTENSION, UNSPECIFIED TYPE: ICD-10-CM

## 2025-08-12 DIAGNOSIS — Z00.00 WELLNESS EXAMINATION: Primary | ICD-10-CM

## 2025-08-12 DIAGNOSIS — E78.00 HYPERCHOLESTEROLEMIA: ICD-10-CM

## 2025-08-12 LAB
ALBUMIN SERPL BCP-MCNC: 4.1 G/DL (ref 3.5–5)
ALBUMIN/GLOB SERPL: 1 {RATIO}
ALP SERPL-CCNC: 111 U/L (ref 40–150)
ALT SERPL W P-5'-P-CCNC: 25 U/L
ANION GAP SERPL CALCULATED.3IONS-SCNC: 15 MMOL/L (ref 7–16)
AST SERPL W P-5'-P-CCNC: 23 U/L (ref 11–45)
BASOPHILS # BLD AUTO: 0.06 K/UL (ref 0–0.2)
BASOPHILS NFR BLD AUTO: 0.7 % (ref 0–1)
BILIRUB SERPL-MCNC: 0.6 MG/DL
BUN SERPL-MCNC: 11 MG/DL (ref 8–26)
BUN/CREAT SERPL: 13 (ref 6–20)
CALCIUM SERPL-MCNC: 9.1 MG/DL (ref 8.4–10.2)
CHLORIDE SERPL-SCNC: 106 MMOL/L (ref 98–107)
CHOLEST SERPL-MCNC: 164 MG/DL
CHOLEST/HDLC SERPL: 4.2 {RATIO}
CO2 SERPL-SCNC: 23 MMOL/L (ref 22–29)
CREAT SERPL-MCNC: 0.87 MG/DL (ref 0.72–1.25)
DIFFERENTIAL METHOD BLD: ABNORMAL
EGFR (NO RACE VARIABLE) (RUSH/TITUS): 103 ML/MIN/1.73M2
EOSINOPHIL # BLD AUTO: 0.29 K/UL (ref 0–0.5)
EOSINOPHIL NFR BLD AUTO: 3.1 % (ref 1–4)
ERYTHROCYTE [DISTWIDTH] IN BLOOD BY AUTOMATED COUNT: 13.2 % (ref 11.5–14.5)
GLOBULIN SER-MCNC: 4.3 G/DL (ref 2–4)
GLUCOSE SERPL-MCNC: 120 MG/DL (ref 74–100)
HCT VFR BLD AUTO: 50.1 % (ref 40–54)
HDLC SERPL-MCNC: 39 MG/DL (ref 35–60)
HGB BLD-MCNC: 16.9 G/DL (ref 13.5–18)
IMM GRANULOCYTES # BLD AUTO: 0.01 K/UL (ref 0–0.04)
IMM GRANULOCYTES NFR BLD: 0.1 % (ref 0–0.4)
LDLC SERPL CALC-MCNC: 97 MG/DL
LDLC/HDLC SERPL: 2.5 {RATIO}
LYMPHOCYTES # BLD AUTO: 3.28 K/UL (ref 1–4.8)
LYMPHOCYTES NFR BLD AUTO: 35.5 % (ref 27–41)
MCH RBC QN AUTO: 28.7 PG (ref 27–31)
MCHC RBC AUTO-ENTMCNC: 33.7 G/DL (ref 32–36)
MCV RBC AUTO: 85.2 FL (ref 80–96)
MONOCYTES # BLD AUTO: 0.89 K/UL (ref 0–0.8)
MONOCYTES NFR BLD AUTO: 9.6 % (ref 2–6)
MPC BLD CALC-MCNC: 10.5 FL (ref 9.4–12.4)
NEUTROPHILS # BLD AUTO: 4.7 K/UL (ref 1.8–7.7)
NEUTROPHILS NFR BLD AUTO: 51 % (ref 53–65)
NONHDLC SERPL-MCNC: 125 MG/DL
NRBC # BLD AUTO: 0 X10E3/UL
NRBC, AUTO (.00): 0 %
PLATELET # BLD AUTO: 298 K/UL (ref 150–400)
POTASSIUM SERPL-SCNC: 3.8 MMOL/L (ref 3.5–5.1)
PROT SERPL-MCNC: 8.4 G/DL (ref 6.4–8.3)
RBC # BLD AUTO: 5.88 M/UL (ref 4.6–6.2)
SODIUM SERPL-SCNC: 140 MMOL/L (ref 136–145)
TRIGL SERPL-MCNC: 138 MG/DL (ref 34–140)
VLDLC SERPL-MCNC: 28 MG/DL
WBC # BLD AUTO: 9.23 K/UL (ref 4.5–11)

## 2025-08-12 PROCEDURE — 80061 LIPID PANEL: CPT | Mod: ,,, | Performed by: CLINICAL MEDICAL LABORATORY

## 2025-08-12 PROCEDURE — 80053 COMPREHEN METABOLIC PANEL: CPT | Mod: ,,, | Performed by: CLINICAL MEDICAL LABORATORY

## 2025-08-12 PROCEDURE — 85025 COMPLETE CBC W/AUTO DIFF WBC: CPT | Mod: ,,, | Performed by: CLINICAL MEDICAL LABORATORY

## 2025-08-12 RX ORDER — METOPROLOL TARTRATE 50 MG/1
TABLET ORAL
Qty: 270 TABLET | Refills: 1 | Status: SHIPPED | OUTPATIENT
Start: 2025-08-12 | End: 2025-08-12

## 2025-08-12 RX ORDER — OMEGA-3-ACID ETHYL ESTERS 1 G/1
2 CAPSULE, LIQUID FILLED ORAL 2 TIMES DAILY
Qty: 360 CAPSULE | Refills: 1 | Status: SHIPPED | OUTPATIENT
Start: 2025-08-12

## 2025-08-12 RX ORDER — AMLODIPINE BESYLATE 10 MG/1
10 TABLET ORAL DAILY
Qty: 90 TABLET | Refills: 1 | Status: SHIPPED | OUTPATIENT
Start: 2025-08-12

## 2025-08-12 RX ORDER — EZETIMIBE 10 MG/1
10 TABLET ORAL NIGHTLY
Qty: 90 TABLET | Refills: 1 | Status: SHIPPED | OUTPATIENT
Start: 2025-08-12

## 2025-08-12 RX ORDER — METOPROLOL TARTRATE 100 MG/1
100 TABLET ORAL 2 TIMES DAILY
Qty: 180 TABLET | Refills: 1 | Status: SHIPPED | OUTPATIENT
Start: 2025-08-12 | End: 2026-08-12

## 2025-08-18 PROBLEM — Z00.00 WELLNESS EXAMINATION: Status: RESOLVED | Noted: 2025-02-12 | Resolved: 2025-08-18

## 2025-08-19 ENCOUNTER — TELEPHONE (OUTPATIENT)
Dept: FAMILY MEDICINE | Facility: CLINIC | Age: 55
End: 2025-08-19
Payer: COMMERCIAL